# Patient Record
Sex: FEMALE | Race: WHITE | NOT HISPANIC OR LATINO | Employment: OTHER | RURAL
[De-identification: names, ages, dates, MRNs, and addresses within clinical notes are randomized per-mention and may not be internally consistent; named-entity substitution may affect disease eponyms.]

---

## 2023-01-05 RX ORDER — DIVALPROEX SODIUM 250 MG/1
250 TABLET, DELAYED RELEASE ORAL 2 TIMES DAILY
COMMUNITY

## 2023-01-05 RX ORDER — CARVEDILOL 6.25 MG/1
6.25 TABLET ORAL 2 TIMES DAILY WITH MEALS
COMMUNITY

## 2023-01-05 RX ORDER — RISPERIDONE 0.5 MG/1
0.5 TABLET ORAL DAILY
COMMUNITY

## 2023-01-05 RX ORDER — ATORVASTATIN CALCIUM 40 MG/1
40 TABLET, FILM COATED ORAL DAILY
COMMUNITY

## 2023-01-05 RX ORDER — FUROSEMIDE 20 MG/1
20 TABLET ORAL DAILY
COMMUNITY

## 2023-01-05 RX ORDER — BUTALBITAL, ACETAMINOPHEN AND CAFFEINE 50; 325; 40 MG/1; MG/1; MG/1
40 TABLET ORAL EVERY 4 HOURS PRN
COMMUNITY

## 2023-01-05 RX ORDER — FENOFIBRATE 145 MG/1
145 TABLET, FILM COATED ORAL DAILY
COMMUNITY

## 2023-01-05 RX ORDER — CLOPIDOGREL BISULFATE 75 MG/1
75 TABLET ORAL DAILY
COMMUNITY

## 2023-01-05 RX ORDER — PAROXETINE 30 MG/1
30 TABLET, FILM COATED ORAL EVERY MORNING
COMMUNITY

## 2023-01-05 RX ORDER — ASPIRIN 81 MG/1
81 TABLET ORAL DAILY
COMMUNITY

## 2023-01-11 ENCOUNTER — OFFICE VISIT (OUTPATIENT)
Dept: SURGERY | Facility: CLINIC | Age: 63
End: 2023-01-11
Payer: MEDICARE

## 2023-01-11 VITALS — OXYGEN SATURATION: 97 % | BODY MASS INDEX: 32.78 KG/M2 | HEART RATE: 82 BPM | HEIGHT: 63 IN | WEIGHT: 185 LBS

## 2023-01-11 DIAGNOSIS — I65.23 BILATERAL CAROTID ARTERY STENOSIS: ICD-10-CM

## 2023-01-11 DIAGNOSIS — I73.9 PVD (PERIPHERAL VASCULAR DISEASE): Primary | ICD-10-CM

## 2023-01-11 PROCEDURE — 99202 OFFICE O/P NEW SF 15 MIN: CPT | Mod: S$PBB,,, | Performed by: SURGERY

## 2023-01-11 PROCEDURE — 99214 OFFICE O/P EST MOD 30 MIN: CPT | Mod: PBBFAC | Performed by: SURGERY

## 2023-01-11 PROCEDURE — 99202 PR OFFICE/OUTPT VISIT, NEW, LEVL II, 15-29 MIN: ICD-10-PCS | Mod: S$PBB,,, | Performed by: SURGERY

## 2023-01-11 NOTE — PROGRESS NOTES
"Subjective:       Patient ID: Meena Duarte is a 62 y.o. female.    Chief Complaint: Consult (Carotid stenosis)  Previous patient of Dr. Roca.  Patient has had a right carotid 2004 lower by Dr. Head.  Her right carotid systems occluded.  She did have 2 strokes that time said no problems since.  Last carotid duplex is proximally year ago.  She does have intermittent leg problems in uses a cane to walk has had again to previous strokes.  She is vaping states she is not smoking.  Denies chest pain shortness of breath any previous cardiac problems does not see a cardiologist  family history includes Heart disease in her father; Hypertension in her father and mother; Stroke in her mother.  Past Medical History:   Diagnosis Date    Carotid stenosis     Hypertension     Mixed hyperlipidemia       Past Surgical History:   Procedure Laterality Date    ANKLE SURGERY      BACK SURGERY      CAROTID ENDARTERECTOMY Right 2004    HYSTERECTOMY      NECK SURGERY         reports that she has been smoking vaping with nicotine. She has never used smokeless tobacco. She reports that she does not drink alcohol and does not use drugs.   HPI  Review of Systems      Objective:      Pulse 82   Ht 5' 3" (1.6 m)   Wt 83.9 kg (185 lb)   SpO2 97%   BMI 32.77 kg/m²    Physical Exam  Exam conducted with a chaperone present.   Constitutional:       Appearance: Normal appearance.   Cardiovascular:      Rate and Rhythm: Normal rate.      Comments: 2+ radial pulses no carotid bruits  Pulmonary:      Effort: Pulmonary effort is normal.   Neurological:      General: No focal deficit present.      Mental Status: She is alert.   Psychiatric:         Mood and Affect: Mood normal.         Behavior: Behavior normal.         Thought Content: Thought content normal.         Judgment: Judgment normal.         Assessment:       1. PVD (peripheral vascular disease)    2. Bilateral carotid artery stenosis        Plan:       Carotid duplex, ABIs, stop vaping, " follow-up after above tests

## 2023-02-02 ENCOUNTER — HOSPITAL ENCOUNTER (OUTPATIENT)
Dept: RADIOLOGY | Facility: HOSPITAL | Age: 63
Discharge: HOME | End: 2023-02-02
Attending: SURGERY
Payer: MEDICARE

## 2023-02-02 ENCOUNTER — OFFICE VISIT (OUTPATIENT)
Dept: SURGERY | Facility: CLINIC | Age: 63
End: 2023-02-02
Payer: MEDICARE

## 2023-02-02 DIAGNOSIS — I65.23 BILATERAL CAROTID ARTERY STENOSIS: ICD-10-CM

## 2023-02-02 DIAGNOSIS — I73.9 PVD (PERIPHERAL VASCULAR DISEASE): ICD-10-CM

## 2023-02-02 DIAGNOSIS — I65.23 BILATERAL CAROTID ARTERY STENOSIS: Primary | ICD-10-CM

## 2023-02-02 PROCEDURE — 93925 LOWER EXTREMITY STUDY: CPT | Mod: 26,,, | Performed by: SURGERY

## 2023-02-02 PROCEDURE — 99213 OFFICE O/P EST LOW 20 MIN: CPT | Mod: S$PBB,,, | Performed by: NURSE PRACTITIONER

## 2023-02-02 PROCEDURE — 93925 LOWER EXTREMITY STUDY: CPT | Mod: TC

## 2023-02-02 PROCEDURE — 93922 UPR/L XTREMITY ART 2 LEVELS: CPT | Mod: 26,,, | Performed by: SURGERY

## 2023-02-02 PROCEDURE — 93925 US ARTERIAL LOWER EXTREMITY BILAT WITH ABI (XPD): ICD-10-PCS | Mod: 26,,, | Performed by: SURGERY

## 2023-02-02 PROCEDURE — 93880 EXTRACRANIAL BILAT STUDY: CPT | Mod: 26,,, | Performed by: SURGERY

## 2023-02-02 PROCEDURE — 99213 OFFICE O/P EST LOW 20 MIN: CPT | Mod: PBBFAC,25 | Performed by: NURSE PRACTITIONER

## 2023-02-02 PROCEDURE — 93922 US ARTERIAL LOWER EXTREMITY BILAT WITH ABI (XPD): ICD-10-PCS | Mod: 26,,, | Performed by: SURGERY

## 2023-02-02 PROCEDURE — 93880 EXTRACRANIAL BILAT STUDY: CPT | Mod: TC

## 2023-02-02 PROCEDURE — 99213 PR OFFICE/OUTPT VISIT, EST, LEVL III, 20-29 MIN: ICD-10-PCS | Mod: S$PBB,,, | Performed by: NURSE PRACTITIONER

## 2023-02-02 PROCEDURE — 93880 US CAROTID BILATERAL: ICD-10-PCS | Mod: 26,,, | Performed by: SURGERY

## 2023-02-02 NOTE — PROGRESS NOTES
Subjective:  Remote history of CVA 2004 with carotid endarterectomy performed in [place] with residual left-sided weakness follows up today for carotid ultrasound chronic occluded right carotid artery left carotid artery less than 50% with ratio 1.5 to, denies claudication       Patient ID: Meena Duarte is a 62 y.o. female.    Chief Complaint: Follow-up (1 month f/u - results)      Past Medical History:   Diagnosis Date    Carotid stenosis     Hypertension     Mixed hyperlipidemia      Past Surgical History:   Procedure Laterality Date    ANKLE SURGERY      BACK SURGERY      CAROTID ENDARTERECTOMY Right 2004    HYSTERECTOMY      NECK SURGERY       Family History   Problem Relation Age of Onset    Hypertension Mother     Stroke Mother     Hypertension Father     Heart disease Father      Social History     Socioeconomic History    Marital status: Single   Tobacco Use    Smoking status: Every Day     Types: Vaping with nicotine    Smokeless tobacco: Never   Substance and Sexual Activity    Alcohol use: Never    Drug use: Never    Sexual activity: Not Currently       Current Outpatient Medications   Medication Sig Dispense Refill    aspirin (ECOTRIN) 81 MG EC tablet Take 81 mg by mouth once daily.      atorvastatin (LIPITOR) 40 MG tablet Take 40 mg by mouth once daily.      butalbital-acetaminophen-caffeine -40 mg (FIORICET, ESGIC) -40 mg per tablet Take 40 tablets by mouth every 4 (four) hours as needed.      carvediloL (COREG) 6.25 MG tablet Take 6.25 mg by mouth 2 (two) times daily with meals.      cetirizine 10 mg TbDL Take 10 mg by mouth once daily.      clopidogreL (PLAVIX) 75 mg tablet Take 75 mg by mouth once daily.      divalproex (DEPAKOTE) 250 MG EC tablet Take 250 mg by mouth 2 (two) times a day.      fenofibrate (TRICOR) 145 MG tablet Take 145 mg by mouth once daily.      furosemide (LASIX) 20 MG tablet Take 20 mg by mouth once daily. M-W-F      paroxetine (PAXIL) 30 MG tablet Take 30 mg by  mouth every morning.      risperiDONE (RISPERDAL) 0.5 MG Tab Take 0.5 mg by mouth once daily.       No current facility-administered medications for this visit.     Review of patient's allergies indicates:   Allergen Reactions    Codeine     Lortab [hydrocodone-acetaminophen]     Tramadol        Review of Systems   Neurological:  Positive for weakness.        Chronic LEFT side weakness   All other systems reviewed and are negative.    Objective:      There were no vitals filed for this visit.  Physical Exam  Vitals and nursing note reviewed.   HENT:      Head: Normocephalic.      Mouth/Throat:      Mouth: Mucous membranes are moist.   Cardiovascular:      Rate and Rhythm: Normal rate.   Pulmonary:      Effort: Pulmonary effort is normal.   Abdominal:      Palpations: Abdomen is soft.   Skin:     General: Skin is warm and dry.      Capillary Refill: Capillary refill takes less than 2 seconds.   Neurological:      Mental Status: She is alert and oriented to person, place, and time.   Psychiatric:         Mood and Affect: Mood normal.         Behavior: Behavior normal.     Lab Review: CBC: No results found for: WBC, RBC, HGB, HCT, PLT  BMP: No results found for: GLU, NA, K, CL, CO2, BUN, CREATININE, CALCIUM  Coagulation: No results found for: PT, INR, APTT  Cardiac markers: No results found for: CKMB, TROPONINT, MYOGLOBIN  ABGs: No results found for: PH, PO2, PCO2  Diagnostics Review: US: Reviewed     Assessment:  Known chronic occluded right carotid stenosis post right carotid endarterectomy 2004 mild left carotid stenosis past medical history CVA with left-sided weakness  LEANNA esentially normal RIGHT 0.91 LEFT 0.86 without claudication or wounds       No diagnosis found.    Plan:  Educated own vein cessation continue statin and aspirin follow-up 1 year with repeat carotid ultrasound call or return for problems

## 2024-01-29 ENCOUNTER — OFFICE VISIT (OUTPATIENT)
Dept: VASCULAR SURGERY | Facility: CLINIC | Age: 64
End: 2024-01-29
Payer: MEDICARE

## 2024-01-29 ENCOUNTER — HOSPITAL ENCOUNTER (OUTPATIENT)
Dept: RADIOLOGY | Facility: HOSPITAL | Age: 64
Discharge: HOME | End: 2024-01-29
Attending: NURSE PRACTITIONER
Payer: MEDICARE

## 2024-01-29 VITALS — BODY MASS INDEX: 32.78 KG/M2 | WEIGHT: 185 LBS | HEIGHT: 63 IN

## 2024-01-29 DIAGNOSIS — I65.22 CAROTID STENOSIS, LEFT: Primary | ICD-10-CM

## 2024-01-29 DIAGNOSIS — I65.23 BILATERAL CAROTID ARTERY STENOSIS: ICD-10-CM

## 2024-01-29 PROCEDURE — 99214 OFFICE O/P EST MOD 30 MIN: CPT | Mod: S$PBB,,, | Performed by: NURSE PRACTITIONER

## 2024-01-29 PROCEDURE — 99214 OFFICE O/P EST MOD 30 MIN: CPT | Mod: PBBFAC,25 | Performed by: NURSE PRACTITIONER

## 2024-01-29 PROCEDURE — 93880 EXTRACRANIAL BILAT STUDY: CPT | Mod: TC

## 2024-01-29 PROCEDURE — 93880 EXTRACRANIAL BILAT STUDY: CPT | Mod: 26,,, | Performed by: SURGERY

## 2024-01-29 NOTE — PROGRESS NOTES
"Subjective:       Patient ID: Meena Duarte is a 63 y.o. female.    Chief Complaint: Follow-up (F/u test results )    Subjective:  Remote history of CVA 2004 with carotid endarterectomy performed in [place] with residual left-sided weakness follows up today for carotid ultrasound chronic occluded right carotid artery left carotid artery less than 50% with ratio 1.5 to, denies claudication      01/29/2024  One year follow-up carotid duplex right carotid chronically occluded left ICA CCA ratio 1.51 less than 50% stenosis neuro is at baseline mild left-sided weakness ambulates with Rollator walker, no new stroke symptoms she does report vaping, past medical history hypertension CVA known chronic right carotid occlusion  family history includes Heart disease in her father; Hypertension in her father and mother; Stroke in her mother.  Past Medical History:   Diagnosis Date    Carotid stenosis     Hypertension     Mixed hyperlipidemia       Past Surgical History:   Procedure Laterality Date    ANKLE SURGERY      BACK SURGERY      CAROTID ENDARTERECTOMY Right 2004    HYSTERECTOMY      NECK SURGERY         reports that she has been smoking vaping with nicotine. She has never used smokeless tobacco. She reports that she does not drink alcohol and does not use drugs.   Follow-up      Review of Systems   Cardiovascular:  Negative for leg swelling and claudication.   Integumentary:  Positive for wound.   All other systems reviewed and are negative.        Objective:      Ht 5' 3" (1.6 m)   Wt 83.9 kg (185 lb)   BMI 32.77 kg/m²    Physical Exam  Vitals and nursing note reviewed.   Constitutional:       Appearance: Normal appearance.   HENT:      Head: Normocephalic.      Mouth/Throat:      Mouth: Mucous membranes are moist.   Eyes:      Conjunctiva/sclera: Conjunctivae normal.   Cardiovascular:      Rate and Rhythm: Normal rate and regular rhythm.   Pulmonary:      Effort: Pulmonary effort is normal.   Abdominal:      " Palpations: Abdomen is soft.   Skin:     General: Skin is warm and dry.   Neurological:      Mental Status: She is alert and oriented to person, place, and time. Mental status is at baseline.      Motor: No weakness.      Comments: Mild left-sided weakness secondary to remote history of CVA 2004   Psychiatric:         Mood and Affect: Mood normal.           Assessment:       1. Carotid stenosis, left        Plan:       Continue antiplatelet aspirin Plavix statin carotid duplex in 1 year educated on signs and symptoms of stroke  Smoking cessation

## 2025-01-28 ENCOUNTER — OFFICE VISIT (OUTPATIENT)
Dept: VASCULAR SURGERY | Facility: CLINIC | Age: 65
End: 2025-01-28
Payer: MEDICARE

## 2025-01-28 ENCOUNTER — HOSPITAL ENCOUNTER (OUTPATIENT)
Dept: RADIOLOGY | Facility: HOSPITAL | Age: 65
Discharge: HOME | End: 2025-01-28
Attending: NURSE PRACTITIONER
Payer: MEDICARE

## 2025-01-28 VITALS — HEIGHT: 63 IN | BODY MASS INDEX: 31.18 KG/M2 | WEIGHT: 176 LBS

## 2025-01-28 DIAGNOSIS — I65.22 CAROTID STENOSIS, LEFT: Primary | ICD-10-CM

## 2025-01-28 DIAGNOSIS — I65.22 CAROTID STENOSIS, LEFT: ICD-10-CM

## 2025-01-28 DIAGNOSIS — I65.21 CAROTID OCCLUSION, RIGHT: ICD-10-CM

## 2025-01-28 PROCEDURE — 99214 OFFICE O/P EST MOD 30 MIN: CPT | Mod: S$PBB,,, | Performed by: NURSE PRACTITIONER

## 2025-01-28 PROCEDURE — 93880 EXTRACRANIAL BILAT STUDY: CPT | Mod: TC

## 2025-01-28 PROCEDURE — 99214 OFFICE O/P EST MOD 30 MIN: CPT | Mod: PBBFAC,25 | Performed by: NURSE PRACTITIONER

## 2025-01-28 PROCEDURE — 99999 PR PBB SHADOW E&M-EST. PATIENT-LVL IV: CPT | Mod: PBBFAC,,, | Performed by: NURSE PRACTITIONER

## 2025-01-28 PROCEDURE — 93880 EXTRACRANIAL BILAT STUDY: CPT | Mod: 26,,, | Performed by: SURGERY

## 2025-01-28 RX ORDER — LIFITEGRAST 50 MG/ML
SOLUTION/ DROPS OPHTHALMIC
COMMUNITY
Start: 2024-11-20

## 2025-01-28 RX ORDER — ACETAMINOPHEN 325 MG/1
TABLET ORAL
COMMUNITY

## 2025-01-28 RX ORDER — FENOFIBRATE 160 MG/1
TABLET ORAL
COMMUNITY

## 2025-01-28 RX ORDER — ZINC GLUCONATE 50 MG
TABLET ORAL
COMMUNITY

## 2025-01-28 RX ORDER — RISPERIDONE 2 MG/1
2 TABLET ORAL NIGHTLY
COMMUNITY

## 2025-01-28 RX ORDER — LOTEPREDNOL ETABONATE 5 MG/G
GEL OPHTHALMIC
COMMUNITY
Start: 2024-12-24

## 2025-01-28 RX ORDER — LANOLIN ALCOHOL/MO/W.PET/CERES
1 CREAM (GRAM) TOPICAL 2 TIMES DAILY
COMMUNITY
Start: 2024-12-24

## 2025-01-28 RX ORDER — LANOLIN ALCOHOL/MO/W.PET/CERES
1000 CREAM (GRAM) TOPICAL DAILY
COMMUNITY

## 2025-01-28 RX ORDER — CHOLECALCIFEROL (VITAMIN D3) 125 MCG
CAPSULE ORAL
COMMUNITY

## 2025-01-28 NOTE — PROGRESS NOTES
"Subjective:       Patient ID: Meena Duarte is a 64 y.o. female.    Chief Complaint: Follow-up ([place])  Established patient with known chronic right carotid occlusion remote history of CVA with residual left-sided weakness right carotid endarterectomy, on Plavix statin, no new neurological symptoms  Carotid duplex occluded right carotid which is chronic  left ICA CCA ratio 1.84 with retrograde flow suggest subclavian steal phenomenon, asymptomatic  family history includes Heart disease in her father; Hypertension in her father and mother; Stroke in her mother.  Past Medical History:   Diagnosis Date    Carotid stenosis     Hypertension     Mixed hyperlipidemia       Past Surgical History:   Procedure Laterality Date    ANKLE SURGERY      BACK SURGERY      CAROTID ENDARTERECTOMY Right 2004    HYSTERECTOMY      NECK SURGERY         reports that she has been smoking vaping with nicotine. She has never used smokeless tobacco. She reports that she does not drink alcohol and does not use drugs.   Follow-up      Review of Systems   Cardiovascular:  Negative for leg swelling and claudication.   Integumentary:  Negative for wound.   All other systems reviewed and are negative.        Objective:      Ht 5' 3" (1.6 m)   Wt 79.8 kg (176 lb)   BMI 31.18 kg/m²    Physical Exam  Vitals and nursing note reviewed.   Constitutional:       Appearance: Normal appearance.   HENT:      Head: Normocephalic.      Mouth/Throat:      Mouth: Mucous membranes are moist.   Eyes:      Conjunctiva/sclera: Conjunctivae normal.   Cardiovascular:      Rate and Rhythm: Normal rate and regular rhythm.   Pulmonary:      Effort: Pulmonary effort is normal.   Abdominal:      Palpations: Abdomen is soft.   Musculoskeletal:      Comments: Ambulates with walker   Skin:     General: Skin is warm and dry.   Neurological:      Mental Status: She is alert and oriented to person, place, and time.   Psychiatric:         Mood and Affect: Mood normal.         "   Assessment:       1. Carotid stenosis, left    2. Carotid occlusion, right        Plan:         Continue antiplatelets, statin  Repeat carotid duplex 1 year